# Patient Record
Sex: MALE | Race: WHITE
[De-identification: names, ages, dates, MRNs, and addresses within clinical notes are randomized per-mention and may not be internally consistent; named-entity substitution may affect disease eponyms.]

---

## 2023-04-24 ENCOUNTER — HOSPITAL ENCOUNTER (INPATIENT)
Dept: HOSPITAL 12 - ER | Age: 73
LOS: 3 days | Discharge: HOME HEALTH | DRG: 813 | End: 2023-04-27
Attending: INTERNAL MEDICINE
Payer: MEDICAID

## 2023-04-24 VITALS — HEIGHT: 62 IN | BODY MASS INDEX: 30.74 KG/M2 | WEIGHT: 167.02 LBS

## 2023-04-24 VITALS — DIASTOLIC BLOOD PRESSURE: 70 MMHG | SYSTOLIC BLOOD PRESSURE: 106 MMHG

## 2023-04-24 DIAGNOSIS — I25.10: ICD-10-CM

## 2023-04-24 DIAGNOSIS — I25.5: ICD-10-CM

## 2023-04-24 DIAGNOSIS — N99.841: Primary | ICD-10-CM
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
## 2023-04-24 DIAGNOSIS — E03.9: ICD-10-CM

## 2023-04-24 DIAGNOSIS — K21.9: ICD-10-CM

## 2023-04-24 DIAGNOSIS — E78.5: ICD-10-CM

## 2023-04-24 DIAGNOSIS — Z93.6: ICD-10-CM
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
## 2023-04-24 DIAGNOSIS — Z95.5: ICD-10-CM

## 2023-04-24 DIAGNOSIS — Z79.899: ICD-10-CM

## 2023-04-24 DIAGNOSIS — R31.9: ICD-10-CM

## 2023-04-24 DIAGNOSIS — K40.90: ICD-10-CM
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
## 2023-04-24 DIAGNOSIS — F32.A: ICD-10-CM

## 2023-04-24 DIAGNOSIS — F41.9: ICD-10-CM

## 2023-04-24 DIAGNOSIS — D64.9: ICD-10-CM

## 2023-04-24 DIAGNOSIS — I50.9: ICD-10-CM

## 2023-04-24 DIAGNOSIS — I21.A1: ICD-10-CM

## 2023-04-24 DIAGNOSIS — N13.2: ICD-10-CM

## 2023-04-24 DIAGNOSIS — E11.9: ICD-10-CM

## 2023-04-24 DIAGNOSIS — R16.1: ICD-10-CM

## 2023-04-24 DIAGNOSIS — Z20.822: ICD-10-CM

## 2023-04-24 DIAGNOSIS — M51.9: ICD-10-CM

## 2023-04-24 DIAGNOSIS — N17.0: ICD-10-CM

## 2023-04-24 DIAGNOSIS — E66.9: ICD-10-CM

## 2023-04-24 DIAGNOSIS — I11.0: ICD-10-CM

## 2023-04-24 DIAGNOSIS — N40.0: ICD-10-CM

## 2023-04-24 DIAGNOSIS — K44.9: ICD-10-CM

## 2023-04-24 DIAGNOSIS — I25.2: ICD-10-CM

## 2023-04-24 LAB
ALP SERPL-CCNC: 90 U/L (ref 50–136)
ALT SERPL W/O P-5'-P-CCNC: 49 U/L (ref 16–63)
APPEARANCE UR: CLEAR
AST SERPL-CCNC: 56 U/L (ref 15–37)
BILIRUB DIRECT SERPL-MCNC: 0.2 MG/DL (ref 0–0.2)
BILIRUB SERPL-MCNC: 0.7 MG/DL (ref 0.2–1)
BILIRUB UR QL STRIP: NEGATIVE
BUN SERPL-MCNC: 31 MG/DL (ref 7–18)
CHLORIDE SERPL-SCNC: 104 MMOL/L (ref 98–107)
CO2 SERPL-SCNC: 29 MMOL/L (ref 21–32)
COLOR UR: YELLOW
CREAT SERPL-MCNC: 1.1 MG/DL (ref 0.6–1.3)
GLUCOSE SERPL-MCNC: 139 MG/DL (ref 74–106)
GLUCOSE UR STRIP-MCNC: NEGATIVE MG/DL
HCT VFR BLD AUTO: 31.1 % (ref 36.7–47.1)
HGB UR QL STRIP: (no result)
KETONES UR STRIP-MCNC: NEGATIVE MG/DL
LEUKOCYTE ESTERASE UR QL STRIP: NEGATIVE
LIPASE SERPL-CCNC: 101 U/L (ref 73–393)
MCH RBC QN AUTO: 29.3 UUG (ref 23.8–33.4)
MCV RBC AUTO: 87.8 FL (ref 73–96.2)
NITRITE UR QL STRIP: NEGATIVE
PH UR STRIP: 5.5 [PH] (ref 5–8)
PLATELET # BLD AUTO: 391 K/UL (ref 152–348)
POTASSIUM SERPL-SCNC: 4.7 MMOL/L (ref 3.5–5.1)
RBC #/AREA URNS HPF: (no result) /HPF (ref 0–3)
SP GR UR STRIP: 1.02 (ref 1–1.03)
UROBILINOGEN UR STRIP-MCNC: 1 E.U./DL
WBC #/AREA URNS HPF: (no result) /HPF (ref 0–3)
WS STN SPEC: 6.8 G/DL (ref 6.4–8.2)

## 2023-04-24 PROCEDURE — G0378 HOSPITAL OBSERVATION PER HR: HCPCS

## 2023-04-24 NOTE — NUR
LAB AT BEDSIDE DRAWING BLOOD. PT C/O PAIN 7/10 IN TESTICLES AND L LOWER BACK AT 
NEPHROSTOMY SITE. MD AWARE.

## 2023-04-24 NOTE — NUR
Patient taken to third floor 303 via gurney with personal belongings. Patient 
in stable condition, no signs of distress. Rashel GASTELUM aware of patient's 
arrival.

## 2023-04-24 NOTE — NUR
Admitted patient in tele floor, tele monitor sinus rhythm, patient alert oriented, speak 
Central African, family helps the interpretation, also [place] African Yoruba speaking Nursing student 
able to assist with admission interview with family and patient. Patient has no natural 
teeth, has bilateral ankle edema 2+, has Nephrostomy tube on left lower back, draining with 
blood clots, serosanguineous fluid and blood tinge color urine in moderate amount. Patient 
still able to urinate from his penis, ambulate to the restroom, complain of groin or penis 
pain will medicate for pain. cont to monitor.

## 2023-04-25 VITALS — SYSTOLIC BLOOD PRESSURE: 107 MMHG | DIASTOLIC BLOOD PRESSURE: 59 MMHG

## 2023-04-25 VITALS — SYSTOLIC BLOOD PRESSURE: 121 MMHG | DIASTOLIC BLOOD PRESSURE: 64 MMHG

## 2023-04-25 VITALS — SYSTOLIC BLOOD PRESSURE: 110 MMHG | DIASTOLIC BLOOD PRESSURE: 60 MMHG

## 2023-04-25 VITALS — DIASTOLIC BLOOD PRESSURE: 66 MMHG | SYSTOLIC BLOOD PRESSURE: 130 MMHG

## 2023-04-25 LAB
BUN SERPL-MCNC: 33 MG/DL (ref 7–18)
CHLORIDE SERPL-SCNC: 106 MMOL/L (ref 98–107)
CHOLEST SERPL-MCNC: 122 MG/DL (ref ?–200)
CO2 SERPL-SCNC: 29 MMOL/L (ref 21–32)
CREAT SERPL-MCNC: 1.3 MG/DL (ref 0.6–1.3)
GLUCOSE SERPL-MCNC: 130 MG/DL (ref 74–106)
HCT VFR BLD AUTO: 27.8 % (ref 36.7–47.1)
HDLC SERPL-MCNC: 38 MG/DL (ref 40–60)
MAGNESIUM SERPL-MCNC: 1.7 MG/DL (ref 1.8–2.4)
MCH RBC QN AUTO: 30.1 UUG (ref 23.8–33.4)
MCV RBC AUTO: 87.7 FL (ref 73–96.2)
PHOSPHATE SERPL-MCNC: 4.2 MG/DL (ref 2.5–4.9)
PLATELET # BLD AUTO: 358 K/UL (ref 152–348)
POTASSIUM SERPL-SCNC: 4.6 MMOL/L (ref 3.5–5.1)
TRIGL SERPL-MCNC: 100 MG/DL (ref 30–150)
TSH SERPL DL<=0.005 MIU/L-ACNC: 18.6 MIU/ML (ref 0.36–3.74)

## 2023-04-25 RX ADMIN — METOPROLOL SUCCINATE SCH MG: 23.75 TABLET, FILM COATED, EXTENDED RELEASE ORAL at 09:07

## 2023-04-25 RX ADMIN — ESCITALOPRAM OXALATE SCH MG: 10 TABLET, FILM COATED ORAL at 09:07

## 2023-04-25 RX ADMIN — MAGNESIUM SULFATE IN DEXTROSE SCH MLS/HR: 10 INJECTION, SOLUTION INTRAVENOUS at 08:56

## 2023-04-25 RX ADMIN — MAGNESIUM SULFATE IN DEXTROSE SCH MLS/HR: 10 INJECTION, SOLUTION INTRAVENOUS at 10:15

## 2023-04-25 RX ADMIN — DIVALPROEX SODIUM SCH MG: 250 TABLET, DELAYED RELEASE ORAL at 17:11

## 2023-04-25 RX ADMIN — TAMSULOSIN HYDROCHLORIDE SCH MG: 0.4 CAPSULE ORAL at 21:04

## 2023-04-25 RX ADMIN — DIVALPROEX SODIUM SCH MG: 250 TABLET, DELAYED RELEASE ORAL at 09:05

## 2023-04-25 RX ADMIN — ATORVASTATIN CALCIUM SCH MG: 40 TABLET, FILM COATED ORAL at 21:04

## 2023-04-25 RX ADMIN — ASPIRIN SCH MG: 81 TABLET, COATED ORAL at 09:05

## 2023-04-25 RX ADMIN — PANTOPRAZOLE SODIUM SCH MG: 40 TABLET, DELAYED RELEASE ORAL at 06:29

## 2023-04-25 NOTE — NUR
RECEIVED PATIENT IN BED AWAKE ALERT AND ORIENTED WITH SOME LANGUAGE BARRIERS BUT CAPABLE OF 
MAKING SIMPLE NEEDS KNOWN DENIES PAIN OR DISCOMFORTS ON ROOM AIR WITH NO SHORTNESS OF BREATH 
NEPHROSTOMY LEFT LATERAL ABDOMEN AREA IS INTACT WITH SERG COLORED URINE WITH SLIGHT STRIPE 
OF PINK PATIENT ALSO VOIDING IN THE URINAL SERG COLORED URINE.SEEN AND EXAMINED BY DR DELGADO WITH ORDER TO REPLACE MAGNESSIUM TODAY AND NOTED LEVEL IS AT 1.7 CALL LIGHT AND 
PERSONAL  BELONGINGS ARE WITHIN EASY REACH AT THIS TIME TELE IS SR WILL CONTINUE TO OBSERVE.

## 2023-04-25 NOTE — NUR
Received patient in bed alert oriented, no sob no chest pain, family at bedside, no complain 
of pain, patient left nephrostomy tube patent draining with yellow red color urine  with 
clots on it, patient able to void thru his penis with dark jovi urine, cont to monitor.

## 2023-04-26 VITALS — SYSTOLIC BLOOD PRESSURE: 122 MMHG | DIASTOLIC BLOOD PRESSURE: 58 MMHG

## 2023-04-26 VITALS — DIASTOLIC BLOOD PRESSURE: 62 MMHG | SYSTOLIC BLOOD PRESSURE: 119 MMHG

## 2023-04-26 VITALS — DIASTOLIC BLOOD PRESSURE: 62 MMHG | SYSTOLIC BLOOD PRESSURE: 117 MMHG

## 2023-04-26 VITALS — SYSTOLIC BLOOD PRESSURE: 121 MMHG | DIASTOLIC BLOOD PRESSURE: 66 MMHG

## 2023-04-26 VITALS — SYSTOLIC BLOOD PRESSURE: 114 MMHG | DIASTOLIC BLOOD PRESSURE: 58 MMHG

## 2023-04-26 LAB
ALP SERPL-CCNC: 78 U/L (ref 50–136)
ALT SERPL W/O P-5'-P-CCNC: 33 U/L (ref 16–63)
AST SERPL-CCNC: 29 U/L (ref 15–37)
BILIRUB SERPL-MCNC: 0.7 MG/DL (ref 0.2–1)
BUN SERPL-MCNC: 26 MG/DL (ref 7–18)
CHLORIDE SERPL-SCNC: 103 MMOL/L (ref 98–107)
CO2 SERPL-SCNC: 27 MMOL/L (ref 21–32)
CREAT SERPL-MCNC: 1.2 MG/DL (ref 0.6–1.3)
GLUCOSE SERPL-MCNC: 119 MG/DL (ref 74–106)
HCT VFR BLD AUTO: 28.3 % (ref 36.7–47.1)
IRON SERPL-MCNC: 39 UG/DL (ref 50–175)
MAGNESIUM SERPL-MCNC: 2.2 MG/DL (ref 1.8–2.4)
MCH RBC QN AUTO: 30.2 UUG (ref 23.8–33.4)
MCV RBC AUTO: 86.9 FL (ref 73–96.2)
PHOSPHATE SERPL-MCNC: 3.4 MG/DL (ref 2.5–4.9)
PLATELET # BLD AUTO: 413 K/UL (ref 152–348)
POTASSIUM SERPL-SCNC: 4.4 MMOL/L (ref 3.5–5.1)
WS STN SPEC: 6.3 G/DL (ref 6.4–8.2)

## 2023-04-26 RX ADMIN — TAMSULOSIN HYDROCHLORIDE SCH MG: 0.4 CAPSULE ORAL at 20:16

## 2023-04-26 RX ADMIN — LEVOTHYROXINE SODIUM SCH MCG: 25 TABLET ORAL at 06:12

## 2023-04-26 RX ADMIN — ATORVASTATIN CALCIUM SCH MG: 40 TABLET, FILM COATED ORAL at 20:16

## 2023-04-26 RX ADMIN — METOPROLOL SUCCINATE SCH MG: 23.75 TABLET, FILM COATED, EXTENDED RELEASE ORAL at 08:50

## 2023-04-26 RX ADMIN — ASPIRIN SCH MG: 81 TABLET, COATED ORAL at 08:50

## 2023-04-26 RX ADMIN — DIVALPROEX SODIUM SCH MG: 250 TABLET, DELAYED RELEASE ORAL at 16:19

## 2023-04-26 RX ADMIN — ESCITALOPRAM OXALATE SCH MG: 10 TABLET, FILM COATED ORAL at 08:50

## 2023-04-26 RX ADMIN — DIVALPROEX SODIUM SCH MG: 250 TABLET, DELAYED RELEASE ORAL at 08:50

## 2023-04-26 RX ADMIN — PANTOPRAZOLE SODIUM SCH MG: 40 TABLET, DELAYED RELEASE ORAL at 06:11

## 2023-04-26 NOTE — NUR
Patient asleep, but arousable,  no sob no chest pain, afebrile denies pain at this time, 
sinus rhythm on tele monitor. Patient still urinate thru the penis with dark jovi urine in 
moderate amount, ambulate with steady gait, cont to monitor.

## 2023-04-26 NOTE — NUR
ABLE TO AMBULATE WITH THE FRONT WHEEL WALKER UP AND DOWN WITH SLOW STEADY GAIT DENIES CHEST 
PAIN AND NOT IN DISTRESS NEPH TUBE IS PATENT AT THIS TIME.

## 2023-04-26 NOTE — NUR
RECEIVED PATIENT IN BED AWAKE ALERT AND ORIENTED WITH LANGUAGE BARRIERS DENIES PAIN OR 
DISCOMFORTS AT THIS TIME ON ROOM AIR WITH NO SHORTNESS OF BREATH CALL LIGHTS AND PERSONAL 
BELONGINGS ARE WITHIN EASY REACH LEFT SIDE NEPHRECTOMY REMAINS INTACT WITH SOME BLOOD TINGED 
DRAINAGE AT THIS TIME WILL CONTINUE TO OBSERVE.

## 2023-04-27 VITALS — SYSTOLIC BLOOD PRESSURE: 121 MMHG | DIASTOLIC BLOOD PRESSURE: 64 MMHG

## 2023-04-27 VITALS — DIASTOLIC BLOOD PRESSURE: 55 MMHG | SYSTOLIC BLOOD PRESSURE: 107 MMHG

## 2023-04-27 VITALS — SYSTOLIC BLOOD PRESSURE: 121 MMHG | DIASTOLIC BLOOD PRESSURE: 60 MMHG

## 2023-04-27 VITALS — SYSTOLIC BLOOD PRESSURE: 137 MMHG | DIASTOLIC BLOOD PRESSURE: 92 MMHG

## 2023-04-27 RX ADMIN — ESCITALOPRAM OXALATE SCH MG: 10 TABLET, FILM COATED ORAL at 08:08

## 2023-04-27 RX ADMIN — DIVALPROEX SODIUM SCH MG: 250 TABLET, DELAYED RELEASE ORAL at 16:56

## 2023-04-27 RX ADMIN — METOPROLOL SUCCINATE SCH MG: 23.75 TABLET, FILM COATED, EXTENDED RELEASE ORAL at 08:12

## 2023-04-27 RX ADMIN — DIVALPROEX SODIUM SCH MG: 250 TABLET, DELAYED RELEASE ORAL at 08:08

## 2023-04-27 RX ADMIN — LEVOTHYROXINE SODIUM SCH MCG: 25 TABLET ORAL at 05:43

## 2023-04-27 RX ADMIN — ASPIRIN SCH MG: 81 TABLET, COATED ORAL at 08:08

## 2023-04-27 RX ADMIN — PANTOPRAZOLE SODIUM SCH MG: 40 TABLET, DELAYED RELEASE ORAL at 05:43

## 2023-04-27 NOTE — NUR
Pt. discharged home and picked up by his wife and grandson. skin assessment done and picture 
included in pt. chart. Personal belonging returned to the patient. All necessary document 
signed and a copy of the document handed to the pt's wife. Provided a wheelchair and CNA 
wheeled the wheelchair down. Pt. noted to be stable upon the discharge. Urostomy bag was 
emptied before the pt. leave. Proper clothing  provided to the patient. IV line removed.

## 2023-04-27 NOTE — NUR
Pt. and family speak Arminian. Discharge process done and when I walked into the room with 
the student who was shadowing me today(Preethi) the pt.'s wife and grandson were at the pt. 
bedside and not able to speak and understand English. Grandson called a lady on the phone 
and I explained her that we are discharging the pt. and she said "ok". Then grandson called 
a man (Denny) who said he is pt.'s son. He started asking me questions about when we want 
to remove the urostomy tube and I explained that he needs to talk with Dr. Calhoun and 
urologist. He started screaming and yelling at me while the phone was on the speaker and my 
witness is the student who was shadowing me (Preethi). He was keep saying that "you are all 
bullshit. what you have done to this pt. so far" and when I asked what happened that made 
him upset and this is my first day having this pt. he screamed louder at me and said "you 
are bullshit, too". He said since the pt. has been admitted to the hospital he asked couple 
of nurses to let  know that he wants to talk to Dr. Calhoun and he was not able to talk to 
cause nurses to not notified Dr. Calhoun. I explained to him that I can contact  to call you 
but he was repeating "you guys all are bullshit and you have not done anything for this 
pt.". Then, I left the room cause he was harassing me over the phone and I notified the 
charge nurse. Also, I called Dr. Calhoun about the incident and he said he already talked with 
the grandson and wife just about 30 minutes ago in a language that they can understand. 
Also, They were forcing us to let the pt. go with hospital gown and any clothes we provided 
they said " no, not this one".

## 2023-04-27 NOTE — NUR
Slept good. No complaint presented.  No significant event reported all night. All needs 
attended and met. Continue care as planned.